# Patient Record
Sex: MALE | Race: WHITE
[De-identification: names, ages, dates, MRNs, and addresses within clinical notes are randomized per-mention and may not be internally consistent; named-entity substitution may affect disease eponyms.]

---

## 2018-03-02 ENCOUNTER — HOSPITAL ENCOUNTER (OUTPATIENT)
Dept: HOSPITAL 58 - RAD | Age: 28
Discharge: HOME | End: 2018-03-02

## 2018-03-02 VITALS — BODY MASS INDEX: 40.5 KG/M2

## 2018-03-02 DIAGNOSIS — M54.5: Primary | ICD-10-CM

## 2018-03-04 NOTE — MRI
EXAM:  Lumbar spine MRI without contrast. 

  

HISTORY:  Low back pain. 

  

COMPARISON:  None. 

  

TECHNIQUE:  Multiplanar, multisequence MR images were acquired lumbar spine without contrast. 

  

FINDINGS:  Conus medullaris ends at L1 and has normal signal intensity.  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  This is largest at L2-3 where measures 1.7
 mm in transverse diameter.  The lumbar canal is developmentally narrow and there is epidural lipomat
osis.  The lumbar vertebra normal in height, the lumbar vertebra normal in height and intrinsic bone 
marrow signal.  There is minor lower lumbar dextroscoliosis and 2 mm of poor translation of L5 with r
espect to L4.  There is minor lumbar ventral spondylosis and mild endplate irregularity from T10-11 t
hrough L4-5.  There is mild disc space narrowing disc desiccation at L4-5.  There are small chronic S
chmorl's nodes at T11, T12, L2, L3 and L4.  At L4-5, there is a minor spondylotic disc bulge with mil
d disc space narrowing that is asymmetric to the left. 

  

The partially visualized liver, spleen and kidneys are unremarkable.  There are no paravertebral mass
es. 

  

T12-L1:  The intervertebral disc is normal. 

  

L1-2:  The intervertebral disc is normal. 

  

L2-3:  The intervertebral disc is normal.  There is minor left facet arthropathy and a small synovial
 cyst posterior to the left facet joint. 

  

L3-4:  The intervertebral disc is normal. 

  

L4-5:  There is a minor disc bulge with endplate osteophytes that is asymmetric to the right which na
rrows the inferior right neural foramen and may encroach on the traversing right L4 nerve.  Mild bila
teral facet arthropathy and ligamentum flavum hypertrophy is present, greater on the left with a tiny
 left facet effusion.  These findings cause minor right foraminal stenosis. There is no central canal
 stenosis. 

  

L5-S1:  There is a minor disc bulge that is asymmetric posteriorly and to the left.  Minor bilateral 
facet arthropathy and ligamentum flavum hypertrophy is present with tiny bilateral facet effusions.  
There is no central canal stenosis or foraminal stenosis. 

  

IMPRESSION: 

1.  Minor lumbar degenerative spondylosis with mild endplate irregularity and small chronic Schmorl's
 nodes at T11, 

T12, L2, L3 and L4. 

2.  No lumbar disc herniations, pars interarticularis defects or central canal stenosis.